# Patient Record
Sex: MALE | Race: WHITE | URBAN - METROPOLITAN AREA
[De-identification: names, ages, dates, MRNs, and addresses within clinical notes are randomized per-mention and may not be internally consistent; named-entity substitution may affect disease eponyms.]

---

## 2022-04-30 ENCOUNTER — TELEPHONE ENCOUNTER (OUTPATIENT)
Dept: URBAN - METROPOLITAN AREA CLINIC 121 | Facility: CLINIC | Age: 57
End: 2022-04-30

## 2022-04-30 RX ORDER — ESOMEPRAZOLE MAGNESIUM 40 MG
TAKE 1 CAPSULE BY MOUTH EVERY MORNING 1/2 HOUR BEFORE BREAKFAST CAPSULE,DELAYED RELEASE (ENTERIC COATED) ORAL
OUTPATIENT
Start: 2013-03-21

## 2022-04-30 RX ORDER — ESOMEPRAZOLE MAGNESIUM 40 MG
TAKE 1 CAPSULE BY MOUTH EVERY MORNING 1/2 HOUR BEFORE BREAKFAST CAPSULE,DELAYED RELEASE (ENTERIC COATED) ORAL
OUTPATIENT
Start: 2013-03-21 | End: 2017-10-18

## 2022-05-01 ENCOUNTER — TELEPHONE ENCOUNTER (OUTPATIENT)
Dept: URBAN - METROPOLITAN AREA CLINIC 121 | Facility: CLINIC | Age: 57
End: 2022-05-01

## 2022-05-01 RX ORDER — OMEPRAZOLE 40 MG/1
CAPSULE, DELAYED RELEASE ORAL
Status: ACTIVE | COMMUNITY
Start: 2017-10-18

## 2022-05-01 RX ORDER — LOSARTAN POTASSIUM 50 MG/1
TABLET, FILM COATED ORAL
Status: ACTIVE | COMMUNITY
Start: 2017-10-18

## 2022-05-01 RX ORDER — ALPRAZOLAM 1 MG
TABLET ORAL
Status: ACTIVE | COMMUNITY
Start: 2017-10-18

## 2022-05-01 RX ORDER — MULTIVIT-MIN/IRON/FOLIC ACID/K 18-600-40
CAPSULE ORAL
Status: ACTIVE | COMMUNITY
Start: 2017-10-18

## 2022-05-01 RX ORDER — NIACIN 500 MG
TABLET, EXTENDED RELEASE ORAL
Status: ACTIVE | COMMUNITY
Start: 2017-10-18

## 2022-05-01 RX ORDER — ELECTROLYTES/DEXTROSE
SOLUTION, ORAL ORAL
Status: ACTIVE | COMMUNITY
Start: 2017-10-18

## 2022-05-01 RX ORDER — FINASTERIDE 5 MG/1
TABLET, FILM COATED ORAL
Status: ACTIVE | COMMUNITY
Start: 2017-10-18

## 2022-10-13 ENCOUNTER — OFFICE VISIT (OUTPATIENT)
Dept: URBAN - METROPOLITAN AREA CLINIC 27 | Facility: CLINIC | Age: 57
End: 2022-10-13

## 2022-12-07 ENCOUNTER — OFFICE VISIT (OUTPATIENT)
Dept: URBAN - METROPOLITAN AREA CLINIC 12 | Facility: CLINIC | Age: 57
End: 2022-12-07
Payer: COMMERCIAL

## 2022-12-07 VITALS
TEMPERATURE: 98.1 F | HEIGHT: 70 IN | WEIGHT: 235.4 LBS | SYSTOLIC BLOOD PRESSURE: 133 MMHG | HEART RATE: 77 BPM | BODY MASS INDEX: 33.7 KG/M2 | DIASTOLIC BLOOD PRESSURE: 87 MMHG

## 2022-12-07 DIAGNOSIS — Z86.010 PERSONAL HISTORY OF COLONIC POLYPS: ICD-10-CM

## 2022-12-07 PROCEDURE — 99203 OFFICE O/P NEW LOW 30 MIN: CPT | Performed by: INTERNAL MEDICINE

## 2022-12-07 PROCEDURE — 99243 OFF/OP CNSLTJ NEW/EST LOW 30: CPT | Performed by: INTERNAL MEDICINE

## 2022-12-07 NOTE — HPI-TODAY'S VISIT:
56 yo male presenting for evaluation for hx of polyps referred by Dr. Jostin Pugh . A copy of this note will be sent to the referring physician -had first colonoscopy 22 years ago for blood in the stool, had polyps. was told every year. then 10 years ago every 2 years. last was in 5/2018- no polyps. was told to return possibly 3 years -he was told that he has precancerous polyps -grandfather with crc- in 90s -no changes in his bm, bleeding, abdominal pain weight loss -he has taken miralax prep with no issue 0states that the only issue he had with colonoscopy-  -*he had surgery on a large rectal polyp close to the anal verge  -no trouble with anesthesia -no hx of high creatinine, normal function -abdominal surgery- kidney resection

## 2023-01-24 PROBLEM — 428283002: Status: ACTIVE | Noted: 2022-12-07

## 2023-02-10 ENCOUNTER — OFFICE VISIT (OUTPATIENT)
Dept: URBAN - METROPOLITAN AREA LAB 3 | Facility: LAB | Age: 58
End: 2023-02-10
Payer: COMMERCIAL

## 2023-02-10 DIAGNOSIS — Z86.010 ADENOMAS PERSONAL HISTORY OF COLONIC POLYPS: ICD-10-CM

## 2023-02-10 DIAGNOSIS — D12.0 ADENOMA OF CECUM: ICD-10-CM

## 2023-02-10 PROCEDURE — 45380 COLONOSCOPY AND BIOPSY: CPT | Performed by: INTERNAL MEDICINE

## 2023-03-09 ENCOUNTER — WEB ENCOUNTER (OUTPATIENT)
Dept: URBAN - METROPOLITAN AREA CLINIC 92 | Facility: CLINIC | Age: 58
End: 2023-03-09

## 2023-07-03 ENCOUNTER — LAB OUTSIDE AN ENCOUNTER (OUTPATIENT)
Dept: URBAN - METROPOLITAN AREA CLINIC 23 | Facility: CLINIC | Age: 58
End: 2023-07-03

## 2023-07-03 ENCOUNTER — WEB ENCOUNTER (OUTPATIENT)
Dept: URBAN - METROPOLITAN AREA CLINIC 23 | Facility: CLINIC | Age: 58
End: 2023-07-03

## 2023-07-03 ENCOUNTER — DASHBOARD ENCOUNTERS (OUTPATIENT)
Age: 58
End: 2023-07-03

## 2023-07-03 ENCOUNTER — OFFICE VISIT (OUTPATIENT)
Dept: URBAN - METROPOLITAN AREA CLINIC 23 | Facility: CLINIC | Age: 58
End: 2023-07-03
Payer: COMMERCIAL

## 2023-07-03 VITALS
HEART RATE: 78 BPM | SYSTOLIC BLOOD PRESSURE: 141 MMHG | WEIGHT: 238.6 LBS | DIASTOLIC BLOOD PRESSURE: 85 MMHG | HEIGHT: 70 IN | BODY MASS INDEX: 34.16 KG/M2 | TEMPERATURE: 98.1 F

## 2023-07-03 DIAGNOSIS — K64.9 ACUTE HEMORRHOID: ICD-10-CM

## 2023-07-03 DIAGNOSIS — Z86.010 PERSONAL HISTORY OF COLONIC POLYPS: ICD-10-CM

## 2023-07-03 PROCEDURE — 99213 OFFICE O/P EST LOW 20 MIN: CPT | Performed by: INTERNAL MEDICINE

## 2023-07-03 RX ORDER — HYDROCORTISONE ACETATE 25 MG/1
1 SUPPOSITORY SUPPOSITORY RECTAL TWICE DAILY
Qty: 28 | Refills: 0 | OUTPATIENT
Start: 2023-07-03 | End: 2023-07-17

## 2023-07-03 NOTE — EXAM-PHYSICAL EXAM
external exam normal digital no palapble abnormality anoscopy left lateral grade 2-3 hemorrhoid ra and rp grade 1

## 2023-07-03 NOTE — HPI-TODAY'S VISIT:
59 yo male presenting for complaint of blood on the stool and itching -had colonoscopy in 2/2023  had  a small ta -states that he has had hx of hemorrhoid ? polyp at anal canal 20 years ago  requiring surgery sx just started six weeks ago occasionally uses cortisone at the area.  it does help when he uses it -the sx are not daily  he is not straining for bowel movement  denies any pain in the anal area, only itching or agitation -had hemorrhoid surgery about 15 years ago -reports that he does sit for long periods of time